# Patient Record
Sex: MALE | URBAN - METROPOLITAN AREA
[De-identification: names, ages, dates, MRNs, and addresses within clinical notes are randomized per-mention and may not be internally consistent; named-entity substitution may affect disease eponyms.]

---

## 2023-03-29 ENCOUNTER — OFFICE VISIT (OUTPATIENT)
Dept: URBAN - METROPOLITAN AREA CLINIC 50 | Facility: CLINIC | Age: 58
End: 2023-03-29

## 2023-03-31 ENCOUNTER — OFFICE VISIT (OUTPATIENT)
Dept: URBAN - METROPOLITAN AREA TELEHEALTH 2 | Facility: TELEHEALTH | Age: 58
End: 2023-03-31
Payer: COMMERCIAL

## 2023-03-31 ENCOUNTER — DASHBOARD ENCOUNTERS (OUTPATIENT)
Age: 58
End: 2023-03-31

## 2023-03-31 VITALS — WEIGHT: 161 LBS | HEIGHT: 66 IN | BODY MASS INDEX: 25.88 KG/M2

## 2023-03-31 DIAGNOSIS — R10.84 GENERALIZED ABDOMINAL PAIN: ICD-10-CM

## 2023-03-31 DIAGNOSIS — Z80.0 FAMILY HISTORY OF COLON CANCER IN FATHER: ICD-10-CM

## 2023-03-31 DIAGNOSIS — R19.7 DIARRHEA, UNSPECIFIED TYPE: ICD-10-CM

## 2023-03-31 DIAGNOSIS — Z86.010 PERSONAL HISTORY OF COLONIC POLYPS: ICD-10-CM

## 2023-03-31 PROBLEM — 428283002: Status: ACTIVE | Noted: 2023-03-31

## 2023-03-31 PROCEDURE — 99203 OFFICE O/P NEW LOW 30 MIN: CPT | Performed by: INTERNAL MEDICINE

## 2023-03-31 PROCEDURE — 99203 OFFICE O/P NEW LOW 30 MIN: CPT

## 2023-03-31 NOTE — HPI-TODAY'S VISIT:
58 yo M presents via [place] for diarrhea for the past week  [place] fan  VIsiting dad and sister up [place] in [facility] (should have seen doctor up there) Feelt tlike her had food poisoning of stomach flu  + abdomninal pain  + diarrhea - every few hours  Initially started with bad sinus infection and HA  Was negative for COVID  Tried to get an appointemnt when he got home but couldnt get in  Now the infection has pretty much run its course Has isolated himself  He has now been doing well  He thinks he got it from his sister  At this point he is not sure what the protocol is from staying away from people  Last episode of diarrhea was yesterday No longer having diarrhea No longer having abominal pain  Feeling much better